# Patient Record
(demographics unavailable — no encounter records)

---

## 2024-10-25 NOTE — HISTORY OF PRESENT ILLNESS
[de-identified] : Patient is a 37 y/o M who has been on Zepbound 2.5 mg since August 2024 presenting today for a follow up. Patient's BMI is 28 and has lost 5 lbs since his last visit. Patient denies any n/v/c/d and states that he has increased physical activity. Patient further states that his he was informed by [facility] that Zepbound is out of stock and they are unable to fulfill further prescriptions. Will look into [facility] as recommended to the patient by his previous pharmacy. No other concerns at this time. Will increase dose to 5 mg and follow up in 3 months.

## 2024-10-25 NOTE — ASSESSMENT
[FreeTextEntry1] : Patient is a 37 y/o M who has been on Zepbound 2.5 mg since August 2024 presenting today for a follow up. Patient's BMI is 28 and has lost 5 lbs since his last visit. Patient denies any n/v/c/d and states that he has increased physical activity. Patient further states that his he was informed by [facility] that Zepbound is out of stock and they are unable to fulfill further prescriptions. Will look into [facility] as recommended to the patient by his previous pharmacy. No other concerns at this time. Will increase dose to 5 mg and follow up in 3 months.

## 2024-10-25 NOTE — ASSESSMENT
[FreeTextEntry1] : Patient is a 39 y/o M who has been on Zepbound 2.5 mg since August 2024 presenting today for a follow up. Patient's BMI is 28 and has lost 5 lbs since his last visit. Patient denies any n/v/c/d and states that he has increased physical activity. Patient further states that his he was informed by [facility] that Zepbound is out of stock and they are unable to fulfill further prescriptions. Will look into [facility] as recommended to the patient by his previous pharmacy. No other concerns at this time. Will increase dose to 5 mg and follow up in 3 months.

## 2024-10-25 NOTE — END OF VISIT
[Time Spent: ___ minutes] : I have spent [unfilled] minutes of time on the encounter which excludes teaching and separately reported services. [FreeTextEntry3] :  All medical record entries made by the Scribe were at my, KAIA Lombardo , direction and personally dictated by me on 10/24/2024 . I have reviewed the chart and agree that the record accurately reflects my personal performance of the history, physical exam, assessment and plan. I have also personally directed, reviewed, and agreed with the chart.

## 2024-10-25 NOTE — ADDENDUM
[FreeTextEntry1] :  Documented by Yoana Mcgarry acting as a scribe for KAIA Sheehan  on 10/24/2024  .

## 2024-10-25 NOTE — HISTORY OF PRESENT ILLNESS
[de-identified] : Patient is a 37 y/o M who has been on Zepbound 2.5 mg since August 2024 presenting today for a follow up. Patient's BMI is 28 and has lost 5 lbs since his last visit. Patient denies any n/v/c/d and states that he has increased physical activity. Patient further states that his he was informed by [facility] that Zepbound is out of stock and they are unable to fulfill further prescriptions. Will look into [facility] as recommended to the patient by his previous pharmacy. No other concerns at this time. Will increase dose to 5 mg and follow up in 3 months.

## 2024-12-21 NOTE — PHYSICAL EXAM
[No Acute Distress] : no acute distress [Well Nourished] : well nourished [Well Developed] : well developed [Well-Appearing] : well-appearing [Normal Sclera/Conjunctiva] : normal sclera/conjunctiva [PERRL] : pupils equal round and reactive to light [EOMI] : extraocular movements intact [Normal Outer Ear/Nose] : the outer ears and nose were normal in appearance [Normal Oropharynx] : the oropharynx was normal [No JVD] : no jugular venous distention [No Lymphadenopathy] : no lymphadenopathy [Supple] : supple [Thyroid Normal, No Nodules] : the thyroid was normal and there were no nodules present [No Respiratory Distress] : no respiratory distress  [No Accessory Muscle Use] : no accessory muscle use [Clear to Auscultation] : lungs were clear to auscultation bilaterally [Normal Rate] : normal rate  [Regular Rhythm] : with a regular rhythm [Normal S1, S2] : normal S1 and S2 [No Murmur] : no murmur heard [No Carotid Bruits] : no carotid bruits [No Abdominal Bruit] : a ~M bruit was not heard ~T in the abdomen [No Varicosities] : no varicosities [Pedal Pulses Present] : the pedal pulses are present [No Edema] : there was no peripheral edema [No Palpable Aorta] : no palpable aorta [No Extremity Clubbing/Cyanosis] : no extremity clubbing/cyanosis [Soft] : abdomen soft [Non Tender] : non-tender [Non-distended] : non-distended [No Masses] : no abdominal mass palpated [No HSM] : no HSM [Normal Bowel Sounds] : normal bowel sounds [Normal Posterior Cervical Nodes] : no posterior cervical lymphadenopathy [Normal Anterior Cervical Nodes] : no anterior cervical lymphadenopathy [No CVA Tenderness] : no CVA  tenderness [No Spinal Tenderness] : no spinal tenderness [No Joint Swelling] : no joint swelling [Grossly Normal Strength/Tone] : grossly normal strength/tone [No Rash] : no rash [Coordination Grossly Intact] : coordination grossly intact [No Focal Deficits] : no focal deficits [Normal Gait] : normal gait [Deep Tendon Reflexes (DTR)] : deep tendon reflexes were 2+ and symmetric [Normal Affect] : the affect was normal [Normal Insight/Judgement] : insight and judgment were intact [Alert and Oriented x3] : oriented to person, place, and time [de-identified] : BMI 27.8

## 2024-12-21 NOTE — END OF VISIT
[FreeTextEntry4] : I Lima Spivey, am scribing for and in the presence of Dr. Miller, the following sections of:  HISTORY OF PRESENT ILLNESS, PAST MEDICAL/FAMILY/SOCIAL HISTORY, ROS, VITALS, PE, DISPOSITION

## 2024-12-21 NOTE — HISTORY OF PRESENT ILLNESS
[FreeTextEntry1] : follow up /general health maintenance for this 38-year-old male with history of GERD overweight low vitamin D low testosterone level, complaining of impotence and ED [de-identified] : 38-year-old male presents for a follow up.  Patient states that he has difficulty maintaining an erection wants his testosterone, checked.  Viagra was discussed patient also has a.  Low vitamin D level Patient voices no further complaints.  Patient denies fever chills cough chest pain or shortness of breath Ros noted below Denies fever, chills, body aches and sob.

## 2024-12-21 NOTE — DATA REVIEWED
[FreeTextEntry1] : Vitamin D 16.9 testosterone level in August Free testosterone 8.6, total testosterone 192

## 2024-12-21 NOTE — REVIEW OF SYSTEMS
[Negative] : Heme/Lymph [Impotence] : impotence [Poor Libido] : poor libido [FreeTextEntry8] : Erectile dysfunction

## 2024-12-21 NOTE — HISTORY OF PRESENT ILLNESS
[FreeTextEntry1] : follow up /general health maintenance for this 38-year-old male with history of GERD overweight low vitamin D low testosterone level, complaining of impotence and ED [de-identified] : 38-year-old male presents for a follow up.  Patient states that he has difficulty maintaining an erection wants his testosterone, checked.  Viagra was discussed patient also has a.  Low vitamin D level Patient voices no further complaints.  Patient denies fever chills cough chest pain or shortness of breath Ros noted below Denies fever, chills, body aches and sob.

## 2024-12-21 NOTE — COUNSELING
[Fall prevention counseling provided] : Fall prevention counseling provided [Adequate lighting] : Adequate lighting [No throw rugs] : No throw rugs [Use proper foot wear] : Use proper foot wear [Use recommended devices] : Use recommended devices [Behavioral health counseling provided] : Behavioral health counseling provided [Sleep ___ hours/day] : Sleep [unfilled] hours/day [Engage in a relaxing activity] : Engage in a relaxing activity [Plan in advance] : Plan in advance [None] : None [Good understanding] : Patient has a good understanding of lifestyle changes and steps needed to achieve self management goal [de-identified] : Total face-to-face time with patient - 15 minutes; >50% involved counselling, review of labs/tests, and/or coordination of medical care:  Symptomatic patients : Test for influenza, if positive, treat for influenza and do not continue below.  1. Fever plus cough or shortness of breath : Test for RVP and COVID-19. 2.Indirect, circumstantial or unclear exposure to COVID-19, or other concerning cases not meeting above criteria: Please call [facility] to discuss testing.  +++ All above cases must be reported to the [facility] registry. +++  Asymptomatic patients:  1. Known first-degree direct-contact exposure to positive COVID-19 patient but asymptomatic: No testing PLUS 14 day self-quarantine. Pt to call if symptoms develop. Report to [facility] Registry. 2. No known exposure and asymptomatic, referred from outside healthcare organization: Please call [facility] to discuss testing.  3.All other asymptomatic patients with no known exposures: no testing, no exceptions.  diet and exercise weight loss.  Low-salt low-fat ADA diet/ htn- Discussed diabetes physiology - Discussed importance of monitoring blood glucose levels - Encouraged a low fat/low cholesterol diet - Discussed symptoms of hyperglycemia and hypoglycemia - Discussed ADA glucose goals - Discussed  HGB A1c and the effects of blood glucose on the level - Discussed Healthy eating, avoidance of concentrated sweets, and to include vegetables by at least 2 meals a day - Discussed regular exercise - Discussed importance of follow up physician visits Limit intake of Sodium (Salt) to less than 2 grams a day to prevent fluid retention-swelling or worsening of symptoms. The importance of keeping the blood pressure at or below 130/80 to prevent stroke, heart attacks, kidney failure, blindness, and loss of limbs was  low chol diet. Avoid fried foods, red meat, butter, eggs, hard cheeses. Use canola or olive oil preferred. ::  was established in which goals would be set, monitoring would be done, and problem solving would also be addressed. The patient would be assisted using behavior change techniques, such as self-help and counseling through behavioral modification: Problem solving using hypnosis and positive medical reinforcement to achieve agreed-upon goals.

## 2024-12-21 NOTE — HEALTH RISK ASSESSMENT
[No] : No [No falls in past year] : Patient reported no falls in the past year [0] : 2) Feeling down, depressed, or hopeless: Not at all (0) [de-identified] : Minimal [de-identified] :  standard [Reviewed no changes] : Reviewed, no changes [Aggressive treatment] : aggressive treatment [Never] : Never

## 2024-12-21 NOTE — COUNSELING
[Fall prevention counseling provided] : Fall prevention counseling provided [Adequate lighting] : Adequate lighting [No throw rugs] : No throw rugs [Use proper foot wear] : Use proper foot wear [Use recommended devices] : Use recommended devices [Behavioral health counseling provided] : Behavioral health counseling provided [Sleep ___ hours/day] : Sleep [unfilled] hours/day [Engage in a relaxing activity] : Engage in a relaxing activity [Plan in advance] : Plan in advance [None] : None [Good understanding] : Patient has a good understanding of lifestyle changes and steps needed to achieve self management goal [de-identified] : Total face-to-face time with patient - 15 minutes; >50% involved counselling, review of labs/tests, and/or coordination of medical care:  Symptomatic patients : Test for influenza, if positive, treat for influenza and do not continue below.  1. Fever plus cough or shortness of breath : Test for RVP and COVID-19. 2.Indirect, circumstantial or unclear exposure to COVID-19, or other concerning cases not meeting above criteria: Please call [facility] to discuss testing.  +++ All above cases must be reported to the [facility] registry. +++  Asymptomatic patients:  1. Known first-degree direct-contact exposure to positive COVID-19 patient but asymptomatic: No testing PLUS 14 day self-quarantine. Pt to call if symptoms develop. Report to [facility] Registry. 2. No known exposure and asymptomatic, referred from outside healthcare organization: Please call [facility] to discuss testing.  3.All other asymptomatic patients with no known exposures: no testing, no exceptions.  diet and exercise weight loss.  Low-salt low-fat ADA diet/ htn- Discussed diabetes physiology - Discussed importance of monitoring blood glucose levels - Encouraged a low fat/low cholesterol diet - Discussed symptoms of hyperglycemia and hypoglycemia - Discussed ADA glucose goals - Discussed  HGB A1c and the effects of blood glucose on the level - Discussed Healthy eating, avoidance of concentrated sweets, and to include vegetables by at least 2 meals a day - Discussed regular exercise - Discussed importance of follow up physician visits Limit intake of Sodium (Salt) to less than 2 grams a day to prevent fluid retention-swelling or worsening of symptoms. The importance of keeping the blood pressure at or below 130/80 to prevent stroke, heart attacks, kidney failure, blindness, and loss of limbs was  low chol diet. Avoid fried foods, red meat, butter, eggs, hard cheeses. Use canola or olive oil preferred. ::  was established in which goals would be set, monitoring would be done, and problem solving would also be addressed. The patient would be assisted using behavior change techniques, such as self-help and counseling through behavioral modification: Problem solving using hypnosis and positive medical reinforcement to achieve agreed-upon goals.

## 2024-12-21 NOTE — HEALTH RISK ASSESSMENT
[No] : No [No falls in past year] : Patient reported no falls in the past year [0] : 2) Feeling down, depressed, or hopeless: Not at all (0) [de-identified] : Minimal [de-identified] :  standard [Reviewed no changes] : Reviewed, no changes [Aggressive treatment] : aggressive treatment [Never] : Never

## 2024-12-21 NOTE — PHYSICAL EXAM
[No Acute Distress] : no acute distress [Well Nourished] : well nourished [Well Developed] : well developed [Well-Appearing] : well-appearing [Normal Sclera/Conjunctiva] : normal sclera/conjunctiva [PERRL] : pupils equal round and reactive to light [EOMI] : extraocular movements intact [Normal Outer Ear/Nose] : the outer ears and nose were normal in appearance [Normal Oropharynx] : the oropharynx was normal [No JVD] : no jugular venous distention [No Lymphadenopathy] : no lymphadenopathy [Supple] : supple [Thyroid Normal, No Nodules] : the thyroid was normal and there were no nodules present [No Respiratory Distress] : no respiratory distress  [No Accessory Muscle Use] : no accessory muscle use [Clear to Auscultation] : lungs were clear to auscultation bilaterally [Normal Rate] : normal rate  [Regular Rhythm] : with a regular rhythm [Normal S1, S2] : normal S1 and S2 [No Murmur] : no murmur heard [No Carotid Bruits] : no carotid bruits [No Abdominal Bruit] : a ~M bruit was not heard ~T in the abdomen [No Varicosities] : no varicosities [Pedal Pulses Present] : the pedal pulses are present [No Edema] : there was no peripheral edema [No Palpable Aorta] : no palpable aorta [No Extremity Clubbing/Cyanosis] : no extremity clubbing/cyanosis [Soft] : abdomen soft [Non Tender] : non-tender [Non-distended] : non-distended [No Masses] : no abdominal mass palpated [No HSM] : no HSM [Normal Bowel Sounds] : normal bowel sounds [Normal Posterior Cervical Nodes] : no posterior cervical lymphadenopathy [Normal Anterior Cervical Nodes] : no anterior cervical lymphadenopathy [No CVA Tenderness] : no CVA  tenderness [No Spinal Tenderness] : no spinal tenderness [No Joint Swelling] : no joint swelling [Grossly Normal Strength/Tone] : grossly normal strength/tone [No Rash] : no rash [Coordination Grossly Intact] : coordination grossly intact [No Focal Deficits] : no focal deficits [Normal Gait] : normal gait [Deep Tendon Reflexes (DTR)] : deep tendon reflexes were 2+ and symmetric [Normal Affect] : the affect was normal [Normal Insight/Judgement] : insight and judgment were intact [Alert and Oriented x3] : oriented to person, place, and time [de-identified] : BMI 27.8

## 2025-02-13 NOTE — RESULTS/DATA
[] : results reviewed [de-identified] : Unremarkable [de-identified] : Unremarkable [de-identified] : Unremarkable unremarkable [de-identified] : Normal sinus rhythm

## 2025-02-13 NOTE — HISTORY OF PRESENT ILLNESS
[No Pertinent Cardiac History] : no history of aortic stenosis, atrial fibrillation, coronary artery disease, recent myocardial infarction, or implantable device/pacemaker [No Pertinent Pulmonary History] : no history of asthma, COPD, sleep apnea, or smoking [No Adverse Anesthesia Reaction] : no adverse anesthesia reaction in self or family member [(Patient denies any chest pain, claudication, dyspnea on exertion, orthopnea, palpitations or syncope)] : Patient denies any chest pain, claudication, dyspnea on exertion, orthopnea, palpitations or syncope [Excellent (>10 METs)] : Excellent (>10 METs) [Aortic Stenosis] : no aortic stenosis [Atrial Fibrillation] : no atrial fibrillation [Coronary Artery Disease] : no coronary artery disease [Recent Myocardial Infarction] : no recent myocardial infarction [Implantable Device/Pacemaker] : no implantable device/pacemaker [Asthma] : no asthma [COPD] : no COPD [Sleep Apnea] : no sleep apnea [Smoker] : not a smoker [Family Member] : no family member with adverse anesthesia reaction/sudden death [Self] : no previous adverse anesthesia reaction [Chronic Anticoagulation] : no chronic anticoagulation [Chronic Kidney Disease] : no chronic kidney disease [Diabetes] : no diabetes [FreeTextEntry1] : 02/28/25 [FreeTextEntry2] : 02/28/25 [FreeTextEntry3] : Dr. Quach  [FreeTextEntry4] : 39 y/o male patient in office for a medical clearance for a deviated septum surgery. Fax results to [phone].  Voices no further complaints ROS as noted below Denies fever, cough, chills, body aches and SOB. [FreeTextEntry6] : Patient denies chest pain shortness of breath palpitations or dizziness [FreeTextEntry7] : EKG

## 2025-02-13 NOTE — RESULTS/DATA
[] : results reviewed [de-identified] : Unremarkable [de-identified] : Unremarkable [de-identified] : Unremarkable unremarkable [de-identified] : Normal sinus rhythm

## 2025-02-13 NOTE — COUNSELING
[Weight management counseling provided] : Weight management [Healthy eating counseling provided] : healthy eating [Activity counseling provided] : activity [Behavioral health counseling provided] : behavioral health  [Target Wt Loss Goal ___] : Target weight loss goal [unfilled] lbs [Weigh Self Once Weekly] : Weigh self once weekly [Low Fat Diet] : Low fat diet [Low Salt Diet] : Low salt diet [Decrease Portions] : Decrease food portions [Keep Food Diary] : Keep food diary [___ min/wk activity recommended] : [unfilled] min/wk activity recommended [Walking] : Walking [Sleep ___ hours/day] : Sleep [unfilled] hours/day [Engage in a relaxing activity] : Engage in a relaxing activity [Plan in advance] : Plan in advance [None] : None [de-identified] :  l wellness visit completed: HRA completed and reviewed with patient Medical, family, surgical history reviewed with patient and updated List of current providers r/w patient and updated Vitals, BMI reviewed and discussed along with healthy BMI goals. Dietary counseling x 15 minutes provided Depression PHQ 9 completed and reviewed  Annual safety assessment reviewed discussed advanced directives smoking cessation counseling provided Established routine screening and immunization schedules       medical clearance/general health maintenance, wellness visit completed: HRA completed and reviewed with patient Medical, family, surgical history reviewed with patient and updated List of current providers r/w patient and updated Vitals, BMI reviewed and discussed along with healthy BMI goals. Dietary counseling x 15 minutes provided Depression PHQ 9 completed and reviewed  Annual safety assessment reviewed discussed advanced directives smoking cessation counseling provided Established routine screening and immunization schedules

## 2025-02-13 NOTE — HISTORY OF PRESENT ILLNESS
[No Pertinent Cardiac History] : no history of aortic stenosis, atrial fibrillation, coronary artery disease, recent myocardial infarction, or implantable device/pacemaker [No Pertinent Pulmonary History] : no history of asthma, COPD, sleep apnea, or smoking [No Adverse Anesthesia Reaction] : no adverse anesthesia reaction in self or family member [(Patient denies any chest pain, claudication, dyspnea on exertion, orthopnea, palpitations or syncope)] : Patient denies any chest pain, claudication, dyspnea on exertion, orthopnea, palpitations or syncope [Excellent (>10 METs)] : Excellent (>10 METs) [Aortic Stenosis] : no aortic stenosis [Atrial Fibrillation] : no atrial fibrillation [Coronary Artery Disease] : no coronary artery disease [Recent Myocardial Infarction] : no recent myocardial infarction [Implantable Device/Pacemaker] : no implantable device/pacemaker [Asthma] : no asthma [COPD] : no COPD [Sleep Apnea] : no sleep apnea [Smoker] : not a smoker [Family Member] : no family member with adverse anesthesia reaction/sudden death [Self] : no previous adverse anesthesia reaction [Chronic Anticoagulation] : no chronic anticoagulation [Chronic Kidney Disease] : no chronic kidney disease [Diabetes] : no diabetes [FreeTextEntry1] : 02/28/25 [FreeTextEntry2] : 02/28/25 [FreeTextEntry3] : Dr. Quach  [FreeTextEntry4] : 37 y/o male patient in office for a medical clearance for a deviated septum surgery. Fax results to [phone].  Voices no further complaints ROS as noted below Denies fever, cough, chills, body aches and SOB. [FreeTextEntry6] : Patient denies chest pain shortness of breath palpitations or dizziness [FreeTextEntry7] : EKG

## 2025-02-13 NOTE — END OF VISIT
[FreeTextEntry4] : I Lima Spivey, am scribing for and in the presence of Dr. Miller, the following sections of:  HISTORY OF PRESENT ILLNESS, PAST MEDICAL/FAMILY/SOCIAL HISTORY, ROS, VITALS, PE, DISPOSITION [Time Spent: ___ minutes] : I have spent [unfilled] minutes of time on the encounter which excludes teaching and separately reported services.

## 2025-02-13 NOTE — PHYSICAL EXAM
[No Acute Distress] : no acute distress [Well Nourished] : well nourished [Well Developed] : well developed [Well-Appearing] : well-appearing [Normal Sclera/Conjunctiva] : normal sclera/conjunctiva [PERRL] : pupils equal round and reactive to light [EOMI] : extraocular movements intact [Normal Outer Ear/Nose] : the outer ears and nose were normal in appearance [Normal Oropharynx] : the oropharynx was normal [No JVD] : no jugular venous distention [No Lymphadenopathy] : no lymphadenopathy [Supple] : supple [Thyroid Normal, No Nodules] : the thyroid was normal and there were no nodules present [No Respiratory Distress] : no respiratory distress  [No Accessory Muscle Use] : no accessory muscle use [Clear to Auscultation] : lungs were clear to auscultation bilaterally [Normal Rate] : normal rate  [Regular Rhythm] : with a regular rhythm [Normal S1, S2] : normal S1 and S2 [No Murmur] : no murmur heard [No Carotid Bruits] : no carotid bruits [No Abdominal Bruit] : a ~M bruit was not heard ~T in the abdomen [No Varicosities] : no varicosities [Pedal Pulses Present] : the pedal pulses are present [No Edema] : there was no peripheral edema [No Palpable Aorta] : no palpable aorta [No Extremity Clubbing/Cyanosis] : no extremity clubbing/cyanosis [Soft] : abdomen soft [Non Tender] : non-tender [Non-distended] : non-distended [No Masses] : no abdominal mass palpated [No HSM] : no HSM [Normal Bowel Sounds] : normal bowel sounds [Normal Posterior Cervical Nodes] : no posterior cervical lymphadenopathy [Normal Anterior Cervical Nodes] : no anterior cervical lymphadenopathy [No CVA Tenderness] : no CVA  tenderness [No Spinal Tenderness] : no spinal tenderness [No Joint Swelling] : no joint swelling [Grossly Normal Strength/Tone] : grossly normal strength/tone [No Rash] : no rash [Coordination Grossly Intact] : coordination grossly intact [No Focal Deficits] : no focal deficits [Normal Gait] : normal gait [Deep Tendon Reflexes (DTR)] : deep tendon reflexes were 2+ and symmetric [Normal Affect] : the affect was normal [Normal Insight/Judgement] : insight and judgment were intact [Declined Rectal Exam] : declined rectal exam [Normal] : affect was normal and insight and judgment were intact [de-identified] : BMI 28.9

## 2025-02-13 NOTE — PHYSICAL EXAM
[No Acute Distress] : no acute distress [Well Nourished] : well nourished [Well Developed] : well developed [Well-Appearing] : well-appearing [Normal Sclera/Conjunctiva] : normal sclera/conjunctiva [PERRL] : pupils equal round and reactive to light [EOMI] : extraocular movements intact [Normal Outer Ear/Nose] : the outer ears and nose were normal in appearance [Normal Oropharynx] : the oropharynx was normal [No JVD] : no jugular venous distention [No Lymphadenopathy] : no lymphadenopathy [Supple] : supple [Thyroid Normal, No Nodules] : the thyroid was normal and there were no nodules present [No Respiratory Distress] : no respiratory distress  [No Accessory Muscle Use] : no accessory muscle use [Clear to Auscultation] : lungs were clear to auscultation bilaterally [Normal Rate] : normal rate  [Regular Rhythm] : with a regular rhythm [Normal S1, S2] : normal S1 and S2 [No Murmur] : no murmur heard [No Carotid Bruits] : no carotid bruits [No Abdominal Bruit] : a ~M bruit was not heard ~T in the abdomen [No Varicosities] : no varicosities [Pedal Pulses Present] : the pedal pulses are present [No Edema] : there was no peripheral edema [No Palpable Aorta] : no palpable aorta [No Extremity Clubbing/Cyanosis] : no extremity clubbing/cyanosis [Soft] : abdomen soft [Non Tender] : non-tender [Non-distended] : non-distended [No Masses] : no abdominal mass palpated [No HSM] : no HSM [Normal Bowel Sounds] : normal bowel sounds [Normal Posterior Cervical Nodes] : no posterior cervical lymphadenopathy [Normal Anterior Cervical Nodes] : no anterior cervical lymphadenopathy [No CVA Tenderness] : no CVA  tenderness [No Spinal Tenderness] : no spinal tenderness [No Joint Swelling] : no joint swelling [Grossly Normal Strength/Tone] : grossly normal strength/tone [No Rash] : no rash [Coordination Grossly Intact] : coordination grossly intact [No Focal Deficits] : no focal deficits [Normal Gait] : normal gait [Deep Tendon Reflexes (DTR)] : deep tendon reflexes were 2+ and symmetric [Normal Affect] : the affect was normal [Normal Insight/Judgement] : insight and judgment were intact [Declined Rectal Exam] : declined rectal exam [Normal] : affect was normal and insight and judgment were intact [de-identified] : BMI 28.9

## 2025-02-13 NOTE — COUNSELING
[Weight management counseling provided] : Weight management [Healthy eating counseling provided] : healthy eating [Activity counseling provided] : activity [Behavioral health counseling provided] : behavioral health  [Target Wt Loss Goal ___] : Target weight loss goal [unfilled] lbs [Weigh Self Once Weekly] : Weigh self once weekly [Low Fat Diet] : Low fat diet [Low Salt Diet] : Low salt diet [Decrease Portions] : Decrease food portions [Keep Food Diary] : Keep food diary [___ min/wk activity recommended] : [unfilled] min/wk activity recommended [Walking] : Walking [Sleep ___ hours/day] : Sleep [unfilled] hours/day [Engage in a relaxing activity] : Engage in a relaxing activity [Plan in advance] : Plan in advance [None] : None [de-identified] :  l wellness visit completed: HRA completed and reviewed with patient Medical, family, surgical history reviewed with patient and updated List of current providers r/w patient and updated Vitals, BMI reviewed and discussed along with healthy BMI goals. Dietary counseling x 15 minutes provided Depression PHQ 9 completed and reviewed  Annual safety assessment reviewed discussed advanced directives smoking cessation counseling provided Established routine screening and immunization schedules       medical clearance/general health maintenance, wellness visit completed: HRA completed and reviewed with patient Medical, family, surgical history reviewed with patient and updated List of current providers r/w patient and updated Vitals, BMI reviewed and discussed along with healthy BMI goals. Dietary counseling x 15 minutes provided Depression PHQ 9 completed and reviewed  Annual safety assessment reviewed discussed advanced directives smoking cessation counseling provided Established routine screening and immunization schedules

## 2025-02-24 NOTE — PHYSICAL EXAM
[TextEntry] : GEN: well nourished, well developed, no acute distress. EXT NOSE: moderately thick skin, no external deviation with right collapsed midvault, tip midline and wide with poor support, appropriate projection and rotation INT NOSE: Mucosa healthy and erythematous, severe leftward septum deviation extending to caudal septum wtih near complete occlusion of left nasal cavity, sllightly narrow internal nasal valve w/mild improvement on Juan maneuver L>R, no dynamic collapse, moderate-severe turbinate hypertrophy

## 2025-02-24 NOTE — ASSESSMENT
[FreeTextEntry1] : . Plan: - Patient has significant nasal obstruction refractory to maximal medical therapy and structural anatomic abnormalities that would benefit from nasal airway surgery. This would comprise open septorhinoplasty to address caudal septal deviation and bilateral turbinate reduction. - Discussed in detail the benefits, alternatives, and risks of this procedure which include, but are not limited to, infection, bleeding, scarring, change in appearance, septal perforation, continued nasal obstruction, and need for revision surgery. The patient reports understanding of these risks, the proposed benefits, and alternatives and wishes to proceed. All questions answered.  -- Alice Quach MD Facial Plastic & Reconstructive Surgery.

## 2025-03-05 NOTE — REASON FOR VISIT
[de-identified] : open nasal airway surgery [de-identified] : 2/28/25 [de-identified] : Pt is doing well since surgery. Pain is well-controlled at this point on just Tylenol/Advil. He did have an incident right after the surgery where his nose got bumped at the hosptial causing significant pain but this has abated. He has been doing the mupirocin ointment and nasal saline sprays as prescribed.   Exam: Dorsum moderate swelling as expected Transcolumellar incision well-healing Septum intact & midline, nasal airway widely patent Nasal tip well-supported   . Plan: - Mupirocin & nasal saline sprays as prescribed. - Nasal precautions reviewed. - RTC 1 month   -- Alice Quach MD Facial Plastic & Reconstructive Surgery

## 2025-03-23 NOTE — PLAN
[FreeTextEntry1] : Assessment: COVID-19 infection - mild, early course. Patient presents with body aches, cough, and fatigue for 3 days, consistent with a mild presentation of COVID-19. No red flag symptoms reported. Patient is otherwise healthy, has no contraindications to Paxlovid, and is at risk of household transmission to a high-risk family member (father with COPD), which may increase urgency of treatment to reduce viral shedding. Cough is reported as persistent but non-productive, and occasionally interfering with sleep and daily function. No signs of wheezing on video or reported dyspnea, but patient requested additional support for cough and occasional chest tightness. Plan: Paxlovid prescribed after confirming no medication contraindications or significant renal/hepatic impairment. Patient counseled on common side effects, including altered taste, diarrhea, and possible rebound symptoms. Tessalon Perles (benzonatate) prescribed 100 mg TID PRN for cough suppression, particularly at night. Albuterol inhaler prescribed 2 puffs every 4-6 hours PRN for chest tightness or wheezing, especially if symptoms worsen. Recommended OTC Robitussin DM for daytime cough suppression and mucus thinning as needed. Supportive care: Encourage rest, hydration, and use of acetaminophen or ibuprofen for body aches or fever. Infection control: Strongly advised to isolate per CDC guidelines to minimize risk to vulnerable household members, especially father with COPD. Monitoring: Monitor for any worsening symptoms. Emergency precautions: Patient was counseled thoroughly on signs and symptoms that warrant immediate evaluation in the Emergency Department, including: worsening shortness of breath, chest pain or pressure, confusion, inability to stay awake, bluish lips or face, or severe dehydration (e.g., inability to keep down fluids, dry mouth, minimal urination). Patient verbalized understanding and agreement with treatment plan and precautions. Follow-up: Primary care follow-up or telehealth check-in in 2-3 days if symptoms worsen or do not improve. Return to urgent care if new symptoms develop. Seek ER care immediately for any red flag symptoms as outlined above.

## 2025-03-23 NOTE — HISTORY OF PRESENT ILLNESS
[Home] : at home, [unfilled] , at the time of the visit. [Other Location: e.g. Home (Enter Location, City,State)___] : at [unfilled] [Telehealth (audio & video)] : This visit was provided via telehealth using real-time 2-way audio visual technology. [Verbal consent obtained from patient] : the patient, [unfilled] [FreeTextEntry8] : Subjective: 38-year-old male presents via telehealth for evaluation of symptoms that began three days ago, including generalized body aches, non-productive cough, and fatigue. He reports feeling progressively more tired but is still able to perform basic activities of daily living. He denies shortness of breath, chest pain, fever, or gastrointestinal symptoms. He is fully vaccinated for COVID-19 and has received the most recent booster. He lives at home with his wife and extended family, including his father who has COPD, and is concerned about spreading the illness. He has no significant past medical history and is not taking any medications. He tested positive for COVID-19 on a home antigen test earlier today and is requesting treatment, specifically Paxlovid.    ROS as above otherwise neg   Physical Exam   General: Awake, alert and oriented. No acute distress. Well-developed, hydrated, and nourished. Appears stated age. HEENT: NCAT, EOMI, neck with normal ROM, no visible masses Cardiopulmonary: RR wnl, no audible wheezes, no signs of respiratory distress Musculoskeletal: normal movement Psychiatric: mood, affect, and thought content normal and linear Neurological: The patient is awake with normal speech. Thought process is intact. Extremities: atraumatic in appearance, moving upper extremities normally Skin: Skin is dry and intact without rashes or lesions.

## 2025-04-02 NOTE — REASON FOR VISIT
[de-identified] : BEN FINNEGAN is status post open nasal airway surgery and he is here for a post-op visit.   Surgery Date: 2/28/25   Pt is doing well since surgery. Pain is well-controlled at this point. He reports that he had significant improvement in breathing though he feels maybe the left side is getting slightly more restricted recently. He does still endorse dryness and tenderness of the dorsum. Still doing saline sprays.  Exam: Dorsum moderate swelling as expected - improving Transcolumellar incision well-healing Septum intact & midline, nasal airway widely patent Slight leftward caudal septal deflection Nasal tip well-supported  . Plan: - Continue nasal saline sprays as prescribed. Netipot as needed - Nasal precautions reviewed. - RTC 1 month  -- Alice Quach MD Facial Plastic & Reconstructive Surgery.

## 2025-06-04 NOTE — REASON FOR VISIT
[de-identified] : BEN FINNEGAN is status post open nasal airway surgery and he is here for a post-op visit. Surgery Date: 2/28/25  Pt is doing well since surgery. Pain is well-controlled at this point though he still notes some tendeness across the dorsum. He reports that he had significant improvement in breathing. Hasn't noted too much dryness lately and hasn't been using saline sprays. Recently returned from [place].   Exam: Dorsum moderate swelling as expected - improving Transcolumellar incision well-healed Septum intact & midline, nasal airway widely patent Nasal tip well-supported  . Plan: - Continue nasal saline sprays as needed - Nasal precautions reviewed. - RTC PRN  -- Alice Quach MD Facial Plastic & Reconstructive Surgery.

## 2025-07-25 NOTE — PHYSICAL EXAM
[de-identified] : Foot/ankle (BL)  Inspection Skin: normal Swelling: none Arch: pes planus Tendon defect: none  Palpation Tenderness: mild Location:  arch of the foot, less so central cord insertion  Ankle ROM Dorsiflexion: normal Plantarflexion: normal Eversion: normal Inversion: normal Toe flexion: normal Toe extension: normal  Foot Motor Strength Ankle plantarflexion: 5/5 Dorsiflexion: 5/5 Inversion: 5/5 Eversion: 5/5  Sensory index Normal

## 2025-07-25 NOTE — HISTORY OF PRESENT ILLNESS
[de-identified] : BEN FINNEGAN is a 39 year, M presenting with bilateral foot pain that started 3-4 months ago. He states the pain has progressively worsened over the past 3 months. The pain is on the bottom of both his feet and is worst in the morning when he first gets out of bed. He works a sedentary job and has been decreasing his activity due to the pain when walking. He currently wears insoles in his shoes for support, performs home exercises and stretches, and takes tylenol for pain as needed. Here today for evaluation.

## 2025-07-25 NOTE — DISCUSSION/SUMMARY
[de-identified] : BEN FINNEGAN is a 39 year, M presenting with bilateral foot pain consistent with plantar fasciitis.   Plan:  1. Home exercise program provided, patient declined formal PT 2. Recommended [place] sock to wear while sleeping 3. Continue shoe insert 4. Follow up as needed, discussed negative SE from plantar fascia CSI and that I would not recommend due to risk of fat pad atrophy and worsening of issue long-term with possible rupture as well. Discussed with patient he may seek second opinion if needed.